# Patient Record
Sex: FEMALE | Race: OTHER | URBAN - METROPOLITAN AREA
[De-identification: names, ages, dates, MRNs, and addresses within clinical notes are randomized per-mention and may not be internally consistent; named-entity substitution may affect disease eponyms.]

---

## 2022-10-21 ENCOUNTER — HOSPITAL ENCOUNTER (OUTPATIENT)
Age: 52
Discharge: HOME | End: 2022-10-21
Payer: COMMERCIAL

## 2022-10-21 VITALS
SYSTOLIC BLOOD PRESSURE: 135 MMHG | DIASTOLIC BLOOD PRESSURE: 77 MMHG | RESPIRATION RATE: 16 BRPM | HEART RATE: 73 BPM | TEMPERATURE: 98 F | OXYGEN SATURATION: 99 %

## 2022-10-21 DIAGNOSIS — H11.31 CONJUNCTIVAL HEMORRHAGE OF RIGHT EYE: Primary | ICD-10-CM

## 2022-10-21 DIAGNOSIS — H10.31 ACUTE CONJUNCTIVITIS OF RIGHT EYE, UNSPECIFIED ACUTE CONJUNCTIVITIS TYPE: ICD-10-CM

## 2022-10-21 RX ORDER — TOBRAMYCIN AND DEXAMETHASONE 3; 1 MG/ML; MG/ML
2 SUSPENSION/ DROPS OPHTHALMIC
Qty: 1 EACH | Refills: 0 | Status: SHIPPED | OUTPATIENT
Start: 2022-10-21 | End: 2022-10-26

## 2022-10-21 NOTE — ED INITIAL ASSESSMENT (HPI)
Pt presents with right eye redness to sclera area. Pt reports \"rubbing an eyelash that was in my eye yesterday morning\". No eye is all red. Not affecting vision and not painful at this time.

## 2023-02-11 ENCOUNTER — HOSPITAL ENCOUNTER (OUTPATIENT)
Age: 53
Discharge: HOME | End: 2023-02-11
Payer: COMMERCIAL

## 2023-02-11 VITALS
HEART RATE: 83 BPM | SYSTOLIC BLOOD PRESSURE: 126 MMHG | DIASTOLIC BLOOD PRESSURE: 100 MMHG | RESPIRATION RATE: 18 BRPM | OXYGEN SATURATION: 99 % | TEMPERATURE: 97 F

## 2023-02-11 DIAGNOSIS — U07.1 COVID: Primary | ICD-10-CM

## 2023-02-11 PROCEDURE — 99213 OFFICE O/P EST LOW 20 MIN: CPT | Performed by: NURSE PRACTITIONER

## 2023-02-11 RX ORDER — NIRMATRELVIR AND RITONAVIR 300-100 MG
KIT ORAL
Qty: 30 TABLET | Refills: 0 | Status: SHIPPED | OUTPATIENT
Start: 2023-02-11 | End: 2023-02-16

## 2023-02-11 NOTE — DISCHARGE INSTRUCTIONS
Drink plenty of fluids. Rest.  Tylenol or Motrin as needed for pain or fever. Take the Paxlovid as prescribed. Follow-up with your doctor as needed. Return for any concerns.

## 2025-06-27 ENCOUNTER — OFFICE VISIT (OUTPATIENT)
Dept: OBGYN CLINIC | Facility: CLINIC | Age: 55
End: 2025-06-27
Payer: COMMERCIAL

## 2025-06-27 VITALS
SYSTOLIC BLOOD PRESSURE: 118 MMHG | DIASTOLIC BLOOD PRESSURE: 72 MMHG | WEIGHT: 167.38 LBS | BODY MASS INDEX: 29.66 KG/M2 | HEIGHT: 63 IN

## 2025-06-27 DIAGNOSIS — Z12.31 SCREENING MAMMOGRAM FOR BREAST CANCER: Primary | ICD-10-CM

## 2025-06-27 DIAGNOSIS — Z12.4 SCREENING FOR CERVICAL CANCER: ICD-10-CM

## 2025-06-27 PROCEDURE — 99386 PREV VISIT NEW AGE 40-64: CPT | Performed by: OBSTETRICS & GYNECOLOGY

## 2025-06-27 PROCEDURE — 87624 HPV HI-RISK TYP POOLED RSLT: CPT | Performed by: OBSTETRICS & GYNECOLOGY

## 2025-06-27 PROCEDURE — 3078F DIAST BP <80 MM HG: CPT | Performed by: OBSTETRICS & GYNECOLOGY

## 2025-06-27 PROCEDURE — 3074F SYST BP LT 130 MM HG: CPT | Performed by: OBSTETRICS & GYNECOLOGY

## 2025-06-27 PROCEDURE — 3008F BODY MASS INDEX DOCD: CPT | Performed by: OBSTETRICS & GYNECOLOGY

## 2025-06-27 NOTE — PROGRESS NOTES
GYN ANNUAL      HPI: Patient is a 55 year old  here for annual gyn exam.     Cycles: amenorrheic x1 year.   Pelvic pain: none.  Abnormal discharge: none.  No longer sexually active, states this is no longer a priority for them.   Bowel: no concerns  Bladder: occasionally leaks urine with coughing/laughing, she was able to improve symptoms over the last year with Kegel exercises and low leaks very small amounts of urine. Does not find this disruptive.   Breast: had some tenderness in her left breast recently and thought she may have felt a lump but it resolved.        OB History    Para Term  AB Living   1    1   SAB IAB Ectopic Multiple Live Births             # Outcome Date GA Lbr Emilio/2nd Weight Sex Type Anes PTL Lv   1 Term 02 40w0d                Current Medications[1]    Past Medical History[2]  Past Surgical History[3]  Allergies[4]  Family History[5]    Social history: tobacco denies, illicit drugs denies, alcohol denies    Review of Systems:  Review of Systems   Constitutional:  Negative for appetite change and fever.   Respiratory:  Negative for chest tightness and shortness of breath.    Cardiovascular:  Negative for chest pain, palpitations and leg swelling.   Gastrointestinal:  Negative for abdominal pain, constipation and diarrhea.   Endocrine: Negative for cold intolerance, heat intolerance, polydipsia and polyuria.   Genitourinary: Negative.  Negative for dyspareunia, dysuria, genital sores, menstrual problem, pelvic pain, urgency and vaginal discharge.   Musculoskeletal:  Negative for myalgias.   Neurological: Negative.  Negative for dizziness and headaches.   Psychiatric/Behavioral:  Negative for dysphoric mood and suicidal ideas.           Ht 63\"   Wt 167 lb 6.4 oz (75.9 kg)   LMP 2024   BMI 29.65 kg/m²     Exam:   GENERAL: well developed, well nourished, in no apparent distress  SKIN: no rashes, no lesions  HEENT: normal  LUNGS: respiration  unlabored  CARDIOVASCULAR: no peripheral edema or varicosities, skin warm and dry  BREASTS: bilaterally nontender, no palpable masses, no nipple discharge, no skin changes, no axillary adenopathy  ABDOMEN: Soft, non distended; non tender, no masses  GYNE/:   External Genitalia: normal, no lesions, good perineal support  Urethra: meatus normal  Bladder: well supported  Vagina: normal mucosa, no lesions, no discharge   Uterus: normal size, mobile, nontender  Cervix: normal os, no lesions or bleeding  Adnexa: normal size, bilaterally nontender, no palpable masses  Cul-de-sac: normal  R/V: normal perineum, no hemorrhoids  EXTREMITIES: nontender without edema        A/P: Patient is 55 year old female here for well-woman exam.       #Annual exam   -Breast and pelvic exam normal  -STI screening: declines  -Pap screening: collected  -Mammogram: ordered by patient's PCP   -Counseling: discussed self breast exam, benign breast exam, mastalgia likely related to ill-fitting bras and chest wall pain, discussed Ca and Vit D supplementation for postmenopausal bone health, recommend weight bearing exercise    Follow up 1yr      Stephanie Ventura DO                    [1]   No current outpatient medications on file.   [2]   Past Medical History:   Psoriasis   [3]   Past Surgical History:  Procedure Laterality Date    Biopsy      polyp   [4] No Known Allergies  [5]   Family History  Problem Relation Age of Onset    Diabetes Sister

## 2025-06-30 LAB — HPV E6+E7 MRNA CVX QL NAA+PROBE: NEGATIVE

## 2025-07-03 LAB — LAST PAP RESULT: NORMAL

## (undated) NOTE — LETTER
Date & Time: 10/21/2022, 3:50 PM  Patient: Cal Knowles  Encounter Provider(s):    CAROLINA Hartman       To Whom It May Concern:    Kayla Mandi was seen and treated in our department on 10/21/2022. She should not return to work until 10/22/2022 or 10/23/2022.     CAROLINA Shabazz, 10/21/22, 3:50 PM